# Patient Record
Sex: MALE | Race: BLACK OR AFRICAN AMERICAN | NOT HISPANIC OR LATINO | Employment: OTHER | URBAN - METROPOLITAN AREA
[De-identification: names, ages, dates, MRNs, and addresses within clinical notes are randomized per-mention and may not be internally consistent; named-entity substitution may affect disease eponyms.]

---

## 2019-06-10 PROBLEM — I10 ESSENTIAL HYPERTENSION: Status: ACTIVE | Noted: 2019-06-10

## 2019-06-10 PROBLEM — E11.8 TYPE 2 DIABETES MELLITUS WITH COMPLICATION, WITHOUT LONG-TERM CURRENT USE OF INSULIN: Status: ACTIVE | Noted: 2019-06-10

## 2019-06-10 PROBLEM — R10.30 LOWER ABDOMINAL PAIN: Status: ACTIVE | Noted: 2019-06-10

## 2019-06-12 PROBLEM — E11.42 DIABETIC POLYNEUROPATHY ASSOCIATED WITH TYPE 2 DIABETES MELLITUS: Chronic | Status: ACTIVE | Noted: 2019-06-12

## 2019-06-12 PROBLEM — E78.2 MIXED HYPERLIPIDEMIA: Status: ACTIVE | Noted: 2019-06-12

## 2019-09-09 PROBLEM — N48.1 BALANITIS: Status: ACTIVE | Noted: 2019-09-09

## 2019-09-10 LAB — CRC RECOMMENDATION EXT: NORMAL

## 2020-01-06 PROBLEM — M17.0 PRIMARY OSTEOARTHRITIS OF BOTH KNEES: Chronic | Status: ACTIVE | Noted: 2020-01-06

## 2020-04-01 ENCOUNTER — NURSE TRIAGE (OUTPATIENT)
Dept: ADMINISTRATIVE | Facility: CLINIC | Age: 50
End: 2020-04-01

## 2020-04-01 NOTE — TELEPHONE ENCOUNTER
Caller c/o cough, sob, body aches, GI symptoms past 3 days.  Reports 2 family members tested positive last week.  IDDM.  No access to anywhere care. Referred to [facility] .    Reason for Disposition   Difficulty breathing (shortness of breath) occurs and onset > 14 days after COVID-19 EXPOSURE (Close Contact)   Cough occurs and onset > 14 days after COVID-19 EXPOSURE   Fever or feeling feverish within 14 Days of COVID-19 EXPOSURE   Cough occurs within 14 days of COVID-19 EXPOSURE   Mild body aches, chills, diarrhea, headache, runny nose, or sore throat occur within 14 days of COVID-19 EXPOSURE    Additional Information   Negative: Severe difficulty breathing (e.g., struggling for each breath, speak in single words, bluish lips)   Negative: Sounds like a life-threatening emergency to the triager   Negative: Common cold symptoms and onset > 14 days after COVID-19 EXPOSURE   Negative: Difficulty breathing occurs within 14 days of COVID-19 EXPOSURE   Negative: Patient sounds very sick or weak to the triager   Negative: COVID-19 EXPOSURE within last 14 days AND NO cough, fever, or breathing difficulty AND exposed person is a healthcare worker who was NOT using all recommended personal protective equipment (i.e., a respirator-N95 mask, eye protection, gloves, and gown)   Negative: Fever (or feeling feverish) or symptoms of lower respiratory illness (e.g., cough, difficulty breathing) AND TRAVEL FROM [place] (or other CDC identified high risk travel area) within last 14 days   Negative: COVID-19 EXPOSURE within last 14 days AND NO cough, fever, or breathing difficulty   Negative: TRAVEL FROM [place] (or other CDC identified high risk travel area) within last 14 days AND NO cough or fever or breathing difficulty   Negative: COVID-19 EXPOSURE 15 or more days ago AND NO cough or fever or breathing difficulty   Negative: No COVID-19 EXPOSURE, but has questions about    Protocols used: CORONAVIRUS (COVID-19)  EXPOSURE-A-[place]

## 2020-05-26 PROBLEM — Z79.4 TYPE 2 DIABETES MELLITUS WITH HYPERGLYCEMIA, WITH LONG-TERM CURRENT USE OF INSULIN: Status: ACTIVE | Noted: 2019-06-10

## 2020-05-26 PROBLEM — E11.65 TYPE 2 DIABETES MELLITUS WITH HYPERGLYCEMIA, WITH LONG-TERM CURRENT USE OF INSULIN: Status: ACTIVE | Noted: 2019-06-10

## 2020-08-04 PROBLEM — R10.31 RIGHT GROIN PAIN: Status: ACTIVE | Noted: 2020-08-04

## 2021-05-12 ENCOUNTER — PATIENT MESSAGE (OUTPATIENT)
Dept: RESEARCH | Facility: HOSPITAL | Age: 51
End: 2021-05-12

## 2022-01-19 PROBLEM — I25.119 CORONARY ARTERY DISEASE INVOLVING NATIVE CORONARY ARTERY OF NATIVE HEART WITH ANGINA PECTORIS: Status: ACTIVE | Noted: 2019-08-23

## 2022-01-19 PROBLEM — N30.01 ACUTE CYSTITIS WITH HEMATURIA: Status: ACTIVE | Noted: 2022-01-19

## 2022-01-19 PROBLEM — I24.9 ACS (ACUTE CORONARY SYNDROME): Status: ACTIVE | Noted: 2019-08-23

## 2022-01-19 PROBLEM — S83.289A LATERAL MENISCUS TEAR: Status: ACTIVE | Noted: 2022-01-19

## 2022-01-19 PROBLEM — B18.2 HEP C W/O COMA, CHRONIC: Status: ACTIVE | Noted: 2018-05-11

## 2022-01-19 PROBLEM — Z12.11 COLON CANCER SCREENING: Status: ACTIVE | Noted: 2019-07-11

## 2022-01-19 PROBLEM — R51.9 HEADACHE: Status: ACTIVE | Noted: 2017-05-08

## 2022-01-19 PROBLEM — N48.1 BALANITIS: Status: ACTIVE | Noted: 2018-08-30

## 2022-02-01 PROBLEM — I20.0 UNSTABLE ANGINA: Status: ACTIVE | Noted: 2022-02-01

## 2022-02-01 PROBLEM — N17.9 AKI (ACUTE KIDNEY INJURY): Status: ACTIVE | Noted: 2022-02-01

## 2022-02-02 PROBLEM — E66.9 OBESITY (BMI 30-39.9): Status: ACTIVE | Noted: 2022-02-02

## 2022-05-24 PROBLEM — R06.02 SOB (SHORTNESS OF BREATH): Status: ACTIVE | Noted: 2022-05-24

## 2022-05-24 PROBLEM — R06.00 DYSPNEA: Status: ACTIVE | Noted: 2022-05-24

## 2022-07-11 PROBLEM — Z01.818 PRE-OP EVALUATION: Status: ACTIVE | Noted: 2022-07-11

## 2022-07-11 PROBLEM — M86.172 ACUTE OSTEOMYELITIS OF TOE OF LEFT FOOT: Status: ACTIVE | Noted: 2022-07-11

## 2022-07-11 PROBLEM — I25.10 CORONARY ARTERY DISEASE INVOLVING NATIVE CORONARY ARTERY OF NATIVE HEART: Status: ACTIVE | Noted: 2022-07-11

## 2022-07-14 PROBLEM — Z01.818 PRE-OP EVALUATION: Status: RESOLVED | Noted: 2022-07-11 | Resolved: 2022-07-14

## 2022-07-15 PROBLEM — E11.610 CHARCOT FOOT DUE TO DIABETES MELLITUS: Status: ACTIVE | Noted: 2022-07-15

## 2022-07-16 PROBLEM — I47.20 VENTRICULAR TACHYCARDIA: Status: ACTIVE | Noted: 2022-07-16

## 2022-08-25 PROBLEM — R53.83 FATIGUE: Status: ACTIVE | Noted: 2022-08-25

## 2022-08-25 PROBLEM — G47.19 EXCESSIVE DAYTIME SLEEPINESS: Status: ACTIVE | Noted: 2022-08-25

## 2023-03-15 DIAGNOSIS — E11.9 TYPE 2 DIABETES MELLITUS WITHOUT COMPLICATION: ICD-10-CM

## 2023-03-17 ENCOUNTER — PATIENT MESSAGE (OUTPATIENT)
Dept: ADMINISTRATIVE | Facility: HOSPITAL | Age: 53
End: 2023-03-17

## 2023-04-10 ENCOUNTER — PATIENT OUTREACH (OUTPATIENT)
Dept: ADMINISTRATIVE | Facility: HOSPITAL | Age: 53
End: 2023-04-10

## 2023-04-10 DIAGNOSIS — E11.9 TYPE 2 DIABETES MELLITUS WITHOUT COMPLICATION, WITHOUT LONG-TERM CURRENT USE OF INSULIN: Primary | ICD-10-CM

## 2023-06-27 ENCOUNTER — PATIENT OUTREACH (OUTPATIENT)
Dept: ADMINISTRATIVE | Facility: HOSPITAL | Age: 53
End: 2023-06-27

## 2023-07-16 PROBLEM — N18.2 CKD (CHRONIC KIDNEY DISEASE) STAGE 2, GFR 60-89 ML/MIN: Chronic | Status: ACTIVE | Noted: 2023-07-16

## 2023-07-16 PROBLEM — N18.9 ACUTE KIDNEY INJURY SUPERIMPOSED ON CKD: Status: ACTIVE | Noted: 2023-07-16

## 2023-07-16 PROBLEM — N17.9 ACUTE KIDNEY INJURY SUPERIMPOSED ON CKD: Status: ACTIVE | Noted: 2023-07-16

## 2023-07-16 PROBLEM — R07.9 CHEST PAIN WITH HIGH RISK FOR CARDIAC ETIOLOGY: Status: ACTIVE | Noted: 2023-07-16

## 2023-07-19 PROBLEM — R07.9 CHEST PAIN WITH HIGH RISK FOR CARDIAC ETIOLOGY: Status: RESOLVED | Noted: 2023-07-16 | Resolved: 2023-07-19

## 2023-07-19 PROBLEM — R06.02 SOB (SHORTNESS OF BREATH): Status: RESOLVED | Noted: 2022-05-24 | Resolved: 2023-07-19

## 2023-07-21 ENCOUNTER — PATIENT OUTREACH (OUTPATIENT)
Dept: ADMINISTRATIVE | Facility: CLINIC | Age: 53
End: 2023-07-21

## 2023-07-21 NOTE — PROGRESS NOTES
C3 nurse attempted to contact Steve Yi and wife's number for a TCC post hospital discharge follow up call. No answer. Left voicemail with callback information. The patient has a scheduled HOSFU appointment with Ranjit Carmona MD  07/24/2023 @ [id number].

## 2023-07-24 NOTE — PROGRESS NOTES
C3 nurse spoke with Steve Yi  for a TCC post hospital discharge follow up call. The patient went to his scheduled [facility] appointment with Ranjit Carmona MD  on 07/24/2023 @ [id number].

## 2023-10-10 PROBLEM — K21.9 CHRONIC GERD: Chronic | Status: ACTIVE | Noted: 2023-10-10

## 2023-10-10 PROBLEM — J30.2 SEASONAL ALLERGIES: Chronic | Status: ACTIVE | Noted: 2023-10-10

## 2023-10-23 PROBLEM — N17.9 ACUTE KIDNEY INJURY SUPERIMPOSED ON CKD: Status: RESOLVED | Noted: 2023-07-16 | Resolved: 2023-10-23

## 2023-10-23 PROBLEM — N18.9 ACUTE KIDNEY INJURY SUPERIMPOSED ON CKD: Status: RESOLVED | Noted: 2023-07-16 | Resolved: 2023-10-23

## 2023-11-10 PROBLEM — S91.331A PENETRATING WOUND OF RIGHT FOOT: Status: ACTIVE | Noted: 2023-11-10

## 2023-11-22 ENCOUNTER — PATIENT MESSAGE (OUTPATIENT)
Dept: ADMINISTRATIVE | Facility: HOSPITAL | Age: 53
End: 2023-11-22

## 2023-11-22 ENCOUNTER — PATIENT OUTREACH (OUTPATIENT)
Dept: ADMINISTRATIVE | Facility: HOSPITAL | Age: 53
End: 2023-11-22

## 2024-01-10 PROBLEM — N18.30 CKD STAGE 3 SECONDARY TO DIABETES: Status: ACTIVE | Noted: 2024-01-10

## 2024-01-10 PROBLEM — E11.22 CKD STAGE 3 SECONDARY TO DIABETES: Status: ACTIVE | Noted: 2024-01-10

## 2024-03-19 ENCOUNTER — PATIENT OUTREACH (OUTPATIENT)
Dept: ADMINISTRATIVE | Facility: HOSPITAL | Age: 54
End: 2024-03-19

## 2024-03-19 ENCOUNTER — PATIENT MESSAGE (OUTPATIENT)
Dept: ADMINISTRATIVE | Facility: HOSPITAL | Age: 54
End: 2024-03-19

## 2024-04-03 PROBLEM — N17.9 AKI (ACUTE KIDNEY INJURY): Status: RESOLVED | Noted: 2022-02-01 | Resolved: 2024-04-03

## 2024-04-03 PROBLEM — E55.9 HYPOVITAMINOSIS D: Chronic | Status: ACTIVE | Noted: 2024-04-03

## 2024-04-03 PROBLEM — N18.2 CKD (CHRONIC KIDNEY DISEASE) STAGE 2, GFR 60-89 ML/MIN: Chronic | Status: RESOLVED | Noted: 2023-07-16 | Resolved: 2024-04-03

## 2024-04-03 PROBLEM — N25.81 SECONDARY HYPERPARATHYROIDISM OF RENAL ORIGIN: Chronic | Status: ACTIVE | Noted: 2024-04-03

## 2024-04-03 PROBLEM — R80.1 PERSISTENT PROTEINURIA: Chronic | Status: ACTIVE | Noted: 2024-04-03

## 2024-06-27 ENCOUNTER — PATIENT OUTREACH (OUTPATIENT)
Dept: ADMINISTRATIVE | Facility: HOSPITAL | Age: 54
End: 2024-06-27

## 2024-06-27 DIAGNOSIS — E11.9 TYPE 2 DIABETES MELLITUS WITHOUT COMPLICATION, WITHOUT LONG-TERM CURRENT USE OF INSULIN: Primary | ICD-10-CM

## 2024-07-30 PROBLEM — K31.84 GASTROPARESIS: Status: ACTIVE | Noted: 2024-07-30

## 2024-07-31 PROBLEM — G89.18 POSTOPERATIVE PAIN: Status: ACTIVE | Noted: 2024-07-31

## 2024-08-23 PROBLEM — N48.1 BALANITIS: Status: RESOLVED | Noted: 2018-08-30 | Resolved: 2024-08-23

## 2024-08-23 PROBLEM — I20.0 UNSTABLE ANGINA: Status: RESOLVED | Noted: 2022-02-01 | Resolved: 2024-08-23

## 2024-08-23 PROBLEM — N30.01 ACUTE CYSTITIS WITH HEMATURIA: Status: RESOLVED | Noted: 2022-01-19 | Resolved: 2024-08-23

## 2024-08-23 PROBLEM — M86.172 ACUTE OSTEOMYELITIS OF TOE OF LEFT FOOT: Status: RESOLVED | Noted: 2022-07-11 | Resolved: 2024-08-23

## 2024-10-09 ENCOUNTER — PATIENT OUTREACH (OUTPATIENT)
Dept: ADMINISTRATIVE | Facility: HOSPITAL | Age: 54
End: 2024-10-09

## 2024-10-09 DIAGNOSIS — E11.9 TYPE 2 DIABETES MELLITUS WITHOUT COMPLICATION, WITHOUT LONG-TERM CURRENT USE OF INSULIN: Primary | ICD-10-CM

## 2025-04-02 PROBLEM — R10.9 ABDOMINAL PAIN: Status: ACTIVE | Noted: 2019-06-10

## 2025-04-02 PROBLEM — N18.31 STAGE 3A CHRONIC KIDNEY DISEASE: Status: ACTIVE | Noted: 2024-01-10

## 2025-04-02 PROBLEM — I21.4 NSTEMI (NON-ST ELEVATED MYOCARDIAL INFARCTION): Status: ACTIVE | Noted: 2025-04-02

## 2025-04-02 PROBLEM — Z79.4 TYPE 2 DIABETES MELLITUS WITHOUT COMPLICATION, WITH LONG-TERM CURRENT USE OF INSULIN: Status: ACTIVE | Noted: 2025-04-02

## 2025-04-02 PROBLEM — E11.9 TYPE 2 DIABETES MELLITUS WITHOUT COMPLICATION, WITH LONG-TERM CURRENT USE OF INSULIN: Status: ACTIVE | Noted: 2025-04-02

## 2025-04-02 PROBLEM — E66.01 MORBID OBESITY: Status: ACTIVE | Noted: 2025-04-02

## 2025-04-02 PROBLEM — M17.0 PRIMARY OSTEOARTHRITIS OF BOTH KNEES: Chronic | Status: RESOLVED | Noted: 2020-01-06 | Resolved: 2025-04-02

## 2025-04-03 PROBLEM — Z79.4 TYPE 2 DIABETES MELLITUS WITHOUT COMPLICATION, WITH LONG-TERM CURRENT USE OF INSULIN: Status: ACTIVE | Noted: 2025-04-03

## 2025-04-03 PROBLEM — E11.9 TYPE 2 DIABETES MELLITUS WITHOUT COMPLICATION, WITH LONG-TERM CURRENT USE OF INSULIN: Status: ACTIVE | Noted: 2025-04-03

## 2025-04-25 PROBLEM — Z98.61 CAD S/P PERCUTANEOUS CORONARY ANGIOPLASTY: Status: ACTIVE | Noted: 2022-07-11

## 2025-04-25 PROBLEM — I21.A1 TYPE 2 MI (MYOCARDIAL INFARCTION): Status: ACTIVE | Noted: 2025-04-02

## 2025-06-02 DIAGNOSIS — K31.7 GASTRIC POLYP: Primary | ICD-10-CM

## 2025-06-27 PROBLEM — R94.31 QT PROLONGATION: Status: ACTIVE | Noted: 2025-06-27

## 2025-06-27 PROBLEM — S99.921A INJURY OF RIGHT FOOT: Status: ACTIVE | Noted: 2025-06-27

## 2025-06-28 PROBLEM — L97.411 DIABETIC ULCER OF RIGHT MIDFOOT ASSOCIATED WITH DIABETES MELLITUS DUE TO UNDERLYING CONDITION, LIMITED TO BREAKDOWN OF SKIN: Status: ACTIVE | Noted: 2025-06-28

## 2025-06-28 PROBLEM — N17.0 ATN (ACUTE TUBULAR NECROSIS): Status: ACTIVE | Noted: 2025-06-28

## 2025-06-28 PROBLEM — E08.621 DIABETIC ULCER OF RIGHT MIDFOOT ASSOCIATED WITH DIABETES MELLITUS DUE TO UNDERLYING CONDITION, LIMITED TO BREAKDOWN OF SKIN: Status: ACTIVE | Noted: 2025-06-28

## 2025-06-29 PROBLEM — N18.4 CKD (CHRONIC KIDNEY DISEASE) STAGE 4, GFR 15-29 ML/MIN: Status: ACTIVE | Noted: 2025-06-29

## 2025-06-30 PROBLEM — R10.9 ABDOMINAL PAIN: Status: RESOLVED | Noted: 2019-06-10 | Resolved: 2025-06-30

## 2025-06-30 PROBLEM — N17.9 AKI (ACUTE KIDNEY INJURY): Status: RESOLVED | Noted: 2022-02-01 | Resolved: 2025-06-30

## 2025-06-30 PROBLEM — R94.31 QT PROLONGATION: Status: RESOLVED | Noted: 2025-06-27 | Resolved: 2025-06-30

## 2025-06-30 PROBLEM — N17.9 ACUTE-ON-CHRONIC KIDNEY INJURY: Status: RESOLVED | Noted: 2023-07-16 | Resolved: 2025-06-30

## 2025-06-30 PROBLEM — N18.9 ACUTE-ON-CHRONIC KIDNEY INJURY: Status: RESOLVED | Noted: 2023-07-16 | Resolved: 2025-06-30

## 2025-06-30 PROBLEM — N18.5 STAGE 5 CHRONIC KIDNEY DISEASE: Status: RESOLVED | Noted: 2025-06-30 | Resolved: 2025-06-30

## 2025-06-30 PROBLEM — N18.31 STAGE 3A CHRONIC KIDNEY DISEASE: Status: RESOLVED | Noted: 2024-01-10 | Resolved: 2025-06-30

## 2025-06-30 PROBLEM — N18.5 STAGE 5 CHRONIC KIDNEY DISEASE: Status: ACTIVE | Noted: 2025-06-30

## 2025-06-30 PROBLEM — N17.0 ATN (ACUTE TUBULAR NECROSIS): Status: RESOLVED | Noted: 2025-06-28 | Resolved: 2025-06-30

## 2025-07-08 PROBLEM — L60.3 DYSTROPHIC NAIL: Status: ACTIVE | Noted: 2025-07-08

## 2025-07-08 PROBLEM — S91.301A OPEN WOUND OF RIGHT FOOT: Status: ACTIVE | Noted: 2023-11-10

## 2025-07-08 PROBLEM — L84 CORNS AND CALLOSITIES: Status: ACTIVE | Noted: 2025-07-08

## 2025-07-08 PROBLEM — L97.509 NEUROPATHIC ULCER OF FOOT: Status: ACTIVE | Noted: 2025-07-08

## 2025-07-29 ENCOUNTER — PATIENT OUTREACH (OUTPATIENT)
Dept: ADMINISTRATIVE | Facility: HOSPITAL | Age: 55
End: 2025-07-29

## 2025-07-29 DIAGNOSIS — E11.65 TYPE 2 DIABETES MELLITUS WITH HYPERGLYCEMIA, WITH LONG-TERM CURRENT USE OF INSULIN: Primary | ICD-10-CM

## 2025-07-29 DIAGNOSIS — Z79.4 TYPE 2 DIABETES MELLITUS WITH HYPERGLYCEMIA, WITH LONG-TERM CURRENT USE OF INSULIN: Primary | ICD-10-CM

## 2025-08-05 ENCOUNTER — PATIENT MESSAGE (OUTPATIENT)
Dept: ADMINISTRATIVE | Facility: HOSPITAL | Age: 55
End: 2025-08-05